# Patient Record
Sex: MALE | Race: WHITE | NOT HISPANIC OR LATINO | Employment: FULL TIME | URBAN - METROPOLITAN AREA
[De-identification: names, ages, dates, MRNs, and addresses within clinical notes are randomized per-mention and may not be internally consistent; named-entity substitution may affect disease eponyms.]

---

## 2018-08-21 ENCOUNTER — APPOINTMENT (OUTPATIENT)
Dept: MRI IMAGING | Facility: HOSPITAL | Age: 33
End: 2018-08-21
Attending: EMERGENCY MEDICINE

## 2018-08-21 ENCOUNTER — HOSPITAL ENCOUNTER (EMERGENCY)
Facility: HOSPITAL | Age: 33
Discharge: HOME | End: 2018-08-21
Attending: EMERGENCY MEDICINE | Admitting: EMERGENCY MEDICINE

## 2018-08-21 VITALS
HEART RATE: 62 BPM | BODY MASS INDEX: 30.16 KG/M2 | RESPIRATION RATE: 13 BRPM | TEMPERATURE: 97.7 F | DIASTOLIC BLOOD PRESSURE: 78 MMHG | HEIGHT: 74 IN | WEIGHT: 235 LBS | SYSTOLIC BLOOD PRESSURE: 143 MMHG | OXYGEN SATURATION: 99 %

## 2018-08-21 DIAGNOSIS — R29.898 UPPER EXTREMITY WEAKNESS: Primary | ICD-10-CM

## 2018-08-21 DIAGNOSIS — G56.10 MEDIAN NERVE NEUROPATHY, UNSPECIFIED LATERALITY: Primary | ICD-10-CM

## 2018-08-21 LAB
ALBUMIN SERPL-MCNC: 4 G/DL (ref 3.5–5.2)
ALBUMIN/GLOB SERPL: 1.4 G/DL
ALP SERPL-CCNC: 54 U/L (ref 39–117)
ALT SERPL W P-5'-P-CCNC: 25 U/L (ref 1–41)
ANION GAP SERPL CALCULATED.3IONS-SCNC: 12.5 MMOL/L
AST SERPL-CCNC: 31 U/L (ref 1–40)
BASOPHILS # BLD AUTO: 0.01 10*3/MM3 (ref 0–0.2)
BASOPHILS NFR BLD AUTO: 0.1 % (ref 0–1.5)
BILIRUB SERPL-MCNC: 0.3 MG/DL (ref 0.1–1.2)
BILIRUB UR QL STRIP: NEGATIVE
BUN BLD-MCNC: 12 MG/DL (ref 6–20)
BUN/CREAT SERPL: 12 (ref 7–25)
CALCIUM SPEC-SCNC: 8.9 MG/DL (ref 8.6–10.5)
CHLORIDE SERPL-SCNC: 104 MMOL/L (ref 98–107)
CLARITY UR: CLEAR
CO2 SERPL-SCNC: 26.5 MMOL/L (ref 22–29)
COLOR UR: YELLOW
CREAT BLD-MCNC: 1 MG/DL (ref 0.76–1.27)
DEPRECATED RDW RBC AUTO: 39.6 FL (ref 37–54)
EOSINOPHIL # BLD AUTO: 0.01 10*3/MM3 (ref 0–0.7)
EOSINOPHIL NFR BLD AUTO: 0.1 % (ref 0.3–6.2)
ERYTHROCYTE [DISTWIDTH] IN BLOOD BY AUTOMATED COUNT: 11.7 % (ref 11.5–14.5)
GFR SERPL CREATININE-BSD FRML MDRD: 87 ML/MIN/1.73
GLOBULIN UR ELPH-MCNC: 2.9 GM/DL
GLUCOSE BLD-MCNC: 88 MG/DL (ref 65–99)
GLUCOSE UR STRIP-MCNC: NEGATIVE MG/DL
HCT VFR BLD AUTO: 44.1 % (ref 40.4–52.2)
HGB BLD-MCNC: 14.9 G/DL (ref 13.7–17.6)
HGB UR QL STRIP.AUTO: NEGATIVE
HOLD SPECIMEN: NORMAL
HOLD SPECIMEN: NORMAL
IMM GRANULOCYTES # BLD: 0.02 10*3/MM3 (ref 0–0.03)
IMM GRANULOCYTES NFR BLD: 0.3 % (ref 0–0.5)
KETONES UR QL STRIP: NEGATIVE
LEUKOCYTE ESTERASE UR QL STRIP.AUTO: NEGATIVE
LYMPHOCYTES # BLD AUTO: 1.64 10*3/MM3 (ref 0.9–4.8)
LYMPHOCYTES NFR BLD AUTO: 22.3 % (ref 19.6–45.3)
MCH RBC QN AUTO: 30.8 PG (ref 27–32.7)
MCHC RBC AUTO-ENTMCNC: 33.8 G/DL (ref 32.6–36.4)
MCV RBC AUTO: 91.3 FL (ref 79.8–96.2)
MONOCYTES # BLD AUTO: 0.75 10*3/MM3 (ref 0.2–1.2)
MONOCYTES NFR BLD AUTO: 10.2 % (ref 5–12)
NEUTROPHILS # BLD AUTO: 4.94 10*3/MM3 (ref 1.9–8.1)
NEUTROPHILS NFR BLD AUTO: 67.3 % (ref 42.7–76)
NITRITE UR QL STRIP: NEGATIVE
PH UR STRIP.AUTO: 5.5 [PH] (ref 5–8)
PLATELET # BLD AUTO: 168 10*3/MM3 (ref 140–500)
PMV BLD AUTO: 10.7 FL (ref 6–12)
POTASSIUM BLD-SCNC: 4 MMOL/L (ref 3.5–5.2)
PROT SERPL-MCNC: 6.9 G/DL (ref 6–8.5)
PROT UR QL STRIP: NEGATIVE
RBC # BLD AUTO: 4.83 10*6/MM3 (ref 4.6–6)
SODIUM BLD-SCNC: 143 MMOL/L (ref 136–145)
SP GR UR STRIP: 1.02 (ref 1–1.03)
TROPONIN T SERPL-MCNC: <0.01 NG/ML (ref 0–0.03)
UROBILINOGEN UR QL STRIP: NORMAL
WBC NRBC COR # BLD: 7.35 10*3/MM3 (ref 4.5–10.7)
WHOLE BLOOD HOLD SPECIMEN: NORMAL
WHOLE BLOOD HOLD SPECIMEN: NORMAL

## 2018-08-21 PROCEDURE — 93005 ELECTROCARDIOGRAM TRACING: CPT | Performed by: EMERGENCY MEDICINE

## 2018-08-21 PROCEDURE — 81003 URINALYSIS AUTO W/O SCOPE: CPT | Performed by: NURSE PRACTITIONER

## 2018-08-21 PROCEDURE — 70551 MRI BRAIN STEM W/O DYE: CPT

## 2018-08-21 PROCEDURE — 72141 MRI NECK SPINE W/O DYE: CPT

## 2018-08-21 PROCEDURE — 99284 EMERGENCY DEPT VISIT MOD MDM: CPT | Performed by: PSYCHIATRY & NEUROLOGY

## 2018-08-21 PROCEDURE — 80053 COMPREHEN METABOLIC PANEL: CPT | Performed by: NURSE PRACTITIONER

## 2018-08-21 PROCEDURE — 93010 ELECTROCARDIOGRAM REPORT: CPT | Performed by: INTERNAL MEDICINE

## 2018-08-21 PROCEDURE — 85025 COMPLETE CBC W/AUTO DIFF WBC: CPT | Performed by: NURSE PRACTITIONER

## 2018-08-21 PROCEDURE — 84484 ASSAY OF TROPONIN QUANT: CPT | Performed by: EMERGENCY MEDICINE

## 2018-08-21 PROCEDURE — 99284 EMERGENCY DEPT VISIT MOD MDM: CPT

## 2018-08-21 NOTE — ED PROVIDER NOTES
EMERGENCY DEPARTMENT ENCOUNTER    CHIEF COMPLAINT  Chief Complaint: bilateral hand weakness  History given by: Pt  History limited by: none  Room Number: 24/24  PMD: Provider, No Known      HPI:  Pt is a 33 y.o. male who presents complaining of bilateral hand weakness for 36 hrs. Pt states weakness in hands has improved some. Pt c/o diaphoresis and fatigue at work this morning. Pt c/o mild weakness in legs and neck pain. Pt denies fever, chills, CP, SOA, nausea, vomiting, dizziness, difficult ambulating. Pt recently went on a trip to [place] but denies insect bites.     Duration:  For 36 hrs  Onset: gradual  Timing: constant  Location: bilateral hands  Radiation: none  Quality: weakness  Intensity/Severity: moderate  Progression: worsening  Associated Symptoms: diaphoresis, fatigue, mild weakness in legs, mild neck pain  Previous Episodes: none  Treatment before arrival: none    PAST MEDICAL HISTORY  Active Ambulatory Problems     Diagnosis Date Noted   • No Active Ambulatory Problems     Resolved Ambulatory Problems     Diagnosis Date Noted   • No Resolved Ambulatory Problems     No Additional Past Medical History       PAST SURGICAL HISTORY  History reviewed. No pertinent surgical history.    FAMILY HISTORY  History reviewed. No pertinent family history.    SOCIAL HISTORY  Social History     Social History   • Marital status:      Spouse name: N/A   • Number of children: N/A   • Years of education: N/A     Occupational History   • Not on file.     Social History Main Topics   • Smoking status: Never Smoker   • Smokeless tobacco: Never Used   • Alcohol use Yes   • Drug use: Unknown   • Sexual activity: Not on file     Other Topics Concern   • Not on file     Social History Narrative   • No narrative on file       ALLERGIES  Patient has no known allergies.    REVIEW OF SYSTEMS  Review of Systems   Constitutional: Positive for diaphoresis and fatigue. Negative for activity change, appetite change, chills  and fever.   HENT: Negative for congestion and sore throat.    Eyes: Negative.    Respiratory: Negative for cough and shortness of breath.    Cardiovascular: Negative for chest pain and leg swelling.   Gastrointestinal: Negative for abdominal pain, diarrhea, nausea and vomiting.   Endocrine: Negative.    Genitourinary: Negative for decreased urine volume and dysuria.   Musculoskeletal: Positive for neck pain (mild).   Skin: Negative for rash and wound.   Allergic/Immunologic: Negative.    Neurological: Positive for dizziness and weakness (bilateral hand, mild in legs). Negative for numbness and headaches.        Denies difficulty ambulating   Hematological: Negative.    Psychiatric/Behavioral: Negative.    All other systems reviewed and are negative.      PHYSICAL EXAM  ED Triage Vitals   Temp Heart Rate Resp BP SpO2   08/21/18 1004 08/21/18 1004 08/21/18 1004 08/21/18 1018 08/21/18 1004   98.2 °F (36.8 °C) 80 16 133/84 99 %      Temp src Heart Rate Source Patient Position BP Location FiO2 (%)   08/21/18 1004 -- -- -- --   Tympanic           Physical Exam   Constitutional: He is oriented to person, place, and time. No distress.   HENT:   Head: Normocephalic and atraumatic.   Mouth/Throat: Oropharynx is clear and moist.   Eyes:   Unremarkable   Neck: Normal range of motion. Neck supple. Muscular tenderness (mild) present.   Cardiovascular: Normal rate and regular rhythm.    Pulmonary/Chest: Breath sounds normal. No respiratory distress.   Abdominal: There is no tenderness.   Musculoskeletal: He exhibits no edema or tenderness.   Neurological: He is alert and oriented to person, place, and time. He displays weakness (BUE). No cranial nerve deficit. Gait normal.   Dereased  strength  BLE motor strength intact  Pt can do deep knee bend   Skin: No rash noted.   Nursing note and vitals reviewed.      LAB RESULTS  Lab Results (last 24 hours)     Procedure Component Value Units Date/Time    CBC & Differential  [[id number]] Collected:  08/21/18 1021    Specimen:  Blood Updated:  08/21/18 1105    Narrative:       The following orders were created for panel order CBC & Differential.  Procedure                               Abnormality         Status                     ---------                               -----------         ------                     CBC Auto Differential[[id number]]        Abnormal            Final result                 Please view results for these tests on the individual orders.    Comprehensive Metabolic Panel [[id number]] Collected:  08/21/18 1021    Specimen:  Blood Updated:  08/21/18 1115     Glucose 88 mg/dL      BUN 12 mg/dL      Creatinine 1.00 mg/dL      Sodium 143 mmol/L      Potassium 4.0 mmol/L      Chloride 104 mmol/L      CO2 26.5 mmol/L      Calcium 8.9 mg/dL      Total Protein 6.9 g/dL      Albumin 4.00 g/dL      ALT (SGPT) 25 U/L      AST (SGOT) 31 U/L      Alkaline Phosphatase 54 U/L      Total Bilirubin 0.3 mg/dL      eGFR Non African Amer 87 mL/min/1.73      Globulin 2.9 gm/dL      A/G Ratio 1.4 g/dL      BUN/Creatinine Ratio 12.0     Anion Gap 12.5 mmol/L     CBC Auto Differential [[id number]]  (Abnormal) Collected:  08/21/18 1021    Specimen:  Blood Updated:  08/21/18 1105     WBC 7.35 10*3/mm3      RBC 4.83 10*6/mm3      Hemoglobin 14.9 g/dL      Hematocrit 44.1 %      MCV 91.3 fL      MCH 30.8 pg      MCHC 33.8 g/dL      RDW 11.7 %      RDW-SD 39.6 fl      MPV 10.7 fL      Platelets 168 10*3/mm3      Neutrophil % 67.3 %      Lymphocyte % 22.3 %      Monocyte % 10.2 %      Eosinophil % 0.1 (L) %      Basophil % 0.1 %      Immature Grans % 0.3 %      Neutrophils, Absolute 4.94 10*3/mm3      Lymphocytes, Absolute 1.64 10*3/mm3      Monocytes, Absolute 0.75 10*3/mm3      Eosinophils, Absolute 0.01 10*3/mm3      Basophils, Absolute 0.01 10*3/mm3      Immature Grans, Absolute 0.02 10*3/mm3     Troponin [[id number]]  (Normal) Collected:  08/21/18 1021    Specimen:  Blood Updated:  08/21/18  1230     Troponin T <0.010 ng/mL     Narrative:       Troponin T Reference Ranges:  Less than 0.03 ng/mL:    Negative for AMI  0.03 to 0.09 ng/mL:      Indeterminant for AMI  Greater than 0.09 ng/mL: Positive for AMI    Urinalysis With Microscopic If Indicated (No Culture) - Urine, Clean Catch [[id number]]  (Normal) Collected:  08/21/18 1122    Specimen:  Urine from Urine, Clean Catch Updated:  08/21/18 1131     Color, UA Yellow     Appearance, UA Clear     pH, UA 5.5     Specific Gravity, UA 1.021     Glucose, UA Negative     Ketones, UA Negative     Bilirubin, UA Negative     Blood, UA Negative     Protein, UA Negative     Leuk Esterase, UA Negative     Nitrite, UA Negative     Urobilinogen, UA 0.2 E.U./dL    Narrative:       Urine microscopic not indicated.          I ordered the above labs and reviewed the results    RADIOLOGY  MRI Cervical Spine Without Contrast   Preliminary Result   Essentially normal MRI of the cervical spine.                  MRI Brain Without Contrast   Preliminary Result   No acute process identified.                       I ordered the above noted radiological studies. Interpreted by radiologist. Reviewed by me in PACS.       PROCEDURES  Procedures  EKG           EKG time: 12:30 PM  Rhythm/Rate: sinus 52  P waves and AL: normal  QRS, axis: normal   ST and T waves: normal     Interpreted Contemporaneously by me, independently viewed  No prior      PROGRESS AND CONSULTS  ED Course as of Aug 21 1747   Tue Aug 21, 2018   1058 Pt feels more tired than normal, just recently returned from vacation.  Feels like his hand  is weaker than normal.  Pt denies chest pain, shortness of air, headache, fever, chills.  [MS]      ED Course User Index  [MS] Dena Swanson, APRN     12:10 PM  Rechecked pt who is resting in NAD. Informed pt of normal labs results.  Pt denies incontinence. Discussed plan to get head MRI.     12:13 PM  MRI brain and cervical spine ordered for further evaluation.  Troponin and EKG ordered.    4:00 PM  Rechecked pt who is resting in NAD. Informed pt of normal MRIs and plan to consult with neurology.   Consult placed to neurology.     4:37 PM  Discussed pt case with Dr. Davidson, neurology. He will see the pt in the ED.     5:36 PM  Spoke with Dr. Davidson after he evaluated the pt. He thinks the pt can be discharged and will order and EMG and f/u with him in 1 week.    5:44 PM  Rechecked pt who is resting in NAD. Informed pt of plan to discharge ad have  F/u with Dr. Davidson. RTER instructions given. Pt understands and agrees with the plan, all questions answered.      MEDICAL DECISION MAKING  Results were reviewed/discussed with the patient and they were also made aware of online access. Pt also made aware that some labs, such as cultures, will not be resulted during ER visit and follow up with PMD is necessary.     MDM  Number of Diagnoses or Management Options     Amount and/or Complexity of Data Reviewed  Clinical lab tests: reviewed and ordered (Troponin is <0.010  UA negative)  Tests in the radiology section of CPT®: reviewed and ordered (MRI brain-nothing acute  MRI cervical spine-normal)  Tests in the medicine section of CPT®: reviewed and ordered (See EKG note)  Decide to obtain previous medical records or to obtain history from someone other than the patient: yes  Review and summarize past medical records: yes  Discuss the patient with other providers: yes (Dr. Davidson, neurology)           DIAGNOSIS  Final diagnoses:   Upper extremity weakness       DISPOSITION  DISCHARGE    Patient discharged in stable condition.    Reviewed implications of results, diagnosis, meds, responsibility to follow up, warning signs and symptoms of possible worsening, potential complications and reasons to return to ER.    Patient/Family voiced understanding of above instructions.    Discussed plan for discharge, as there is no emergent indication for admission. Patient referred to  primary care provider for BP management due to today's BP. Pt/family is agreeable and understands need for follow up and repeat testing.  Pt is aware that discharge does not mean that nothing is wrong but it indicates no emergency is present that requires admission and they must continue care with follow-up as given below or physician of their choice.     FOLLOW-UP  [facility] [place], [place] NEUROLOGY [facility]  [address]  [place] [address] [phone]    follow up with Dr. Lizama for continued evaluation of symptoms         Medication List      No changes were made to your prescriptions during this visit.           Latest Documented Vital Signs:  As of 5:47 PM  BP- 143/78 HR- 59 Temp- 98.2 °F (36.8 °C) (Tympanic) O2 sat- 98%    --  Documentation assistance provided by rivera Díaz for Dr. Juarez.  Information recorded by the scribe was done at my direction and has been verified and validated by me.       Mona Díaz  08/21/18 1284       Steven Juarez MD  08/21/18 8712

## 2018-08-21 NOTE — CONSULTS
Neurology Consult Note    Consult Date: 8/21/2018    Referring MD: No ref. provider found    Reason for Consult I have been asked to see the patient in neurological consultation to render advice and opinion regarding bilateral hand weakness    Modesto Glass is a 33 y.o. male with no PMH who presents with 2 days of bilateral hand weakness. Onset of symptoms was yesterday; upon awakening he noticed muscle soreness of both forearms then noticed he had difficulty with  in both hands and was unable to tie his shoes or open jars. Today he went to work (he works as a  in an ED) and was unable to perform simple procedures in his job and felt fatigued after only 20 minutes of work. His coworkers recommended he come in to the ED to be evaluated. Since onset of his symptoms yesterday he feels there has been mild improvement of his hand strength.     Past Medical/Surgical Hx:  History reviewed. No pertinent past medical history.  History reviewed. No pertinent surgical history.    Medications On Admission  No medications      Allergies:  No Known Allergies    Social Hx:  Social History     Social History   • Marital status:      Spouse name: N/A   • Number of children: N/A   • Years of education: N/A     Occupational History   • Not on file.     Social History Main Topics   • Smoking status: Never Smoker   • Smokeless tobacco: Never Used   • Alcohol use Yes   • Drug use: Unknown   • Sexual activity: Not on file     Other Topics Concern   • Not on file     Social History Narrative   • No narrative on file       Family Hx:  History reviewed. No pertinent family history.    Review of Systems  Constitutional: [No fevers, chills]  Eye: [No recent visual problems, eye discharge]  HEENT: [No ear pain, nasal congestion]  Respiratory: [No shortness of breath, cough]  Cardiovascular: [No Chest pain, palpitations]  Gastrointestinal: [No nausea, vomiting]  Genitourinary: [No hematuria, incontinence]  Hema/Lymph:  "[no nosebleeds, history of anticoagulation]  Endocrine: [Negative for excessive urination, heat or cold intolerance]  Musculoskeletal: [No back pain, neck pain]  Integumentary: [No rash, pruritus]  Neurologic: [+ weakness, no numbness]  Psychiatric: [No anxiety, depression]    Exam    /78   Pulse 59   Temp 98.2 °F (36.8 °C) (Tympanic)   Resp 16   Ht 188 cm (74\")   Wt 107 kg (235 lb)   SpO2 98%   BMI 30.17 kg/m²   gen: NAD, vitals reviewed  Eyes: fundus sharp with no papilledema or retinal hemorrhages  HEENT: no nuchal rigidity  CVS: RRR, S1, S2  MS: oriented x3, recent/remote memory intact, normal attention/concentration, language intact, no neglect, normal fund of knowledge  CN: visual acuity grossly normal, visual fields full, PERRL, EOMI, facial sensation equal, no facial droop, hearing symmetric, no dysarthria, shoulder shrug equal, tongue midline  Motor: 4+/5 bilateral FDP and FPL, otherwise 5/5 throughout upper and lower extremities, normal tone  Sensation: intact to vibration and temperature throughout  Reflexes: 2+ throughout upper and lower extremities, downgoing plantars  Coordination: no dysmetria with finger to nose bilaterally  Gait: no ataxia    DATA:    Lab Results   Component Value Date    GLUCOSE 88 08/21/2018    CALCIUM 8.9 08/21/2018     08/21/2018    K 4.0 08/21/2018    CO2 26.5 08/21/2018     08/21/2018    BUN 12 08/21/2018    CREATININE 1.00 08/21/2018    EGFRIFNONA 87 08/21/2018    BCR 12.0 08/21/2018    ANIONGAP 12.5 08/21/2018     Lab Results   Component Value Date    WBC 7.35 08/21/2018    HGB 14.9 08/21/2018    HCT 44.1 08/21/2018    MCV 91.3 08/21/2018     08/21/2018     No results found for: CHOL  No results found for: HDL  No results found for: LDL  No results found for: TRIG  No results found for: HGBA1C  No results found for: INR, PROTIME    Imaging review: MRI brain and spine personally reviewed which show no stroke, no MS lesions, spinal cord signal " looks normal, no disc herniations or root impingement seen.    Discussed with Dr. Juarez    CBC BMP normal    Impression:  1) Bilateral median motor neuropathy  2) Fatigue    Comment: 34 yo WM otherwise healthy presents with acute bilateral hand weakness in a median nerve distribution without any associated sensory loss. Symptoms do not seem to be progressing since onset and actually he feels his hand strength has somewhat improved. Cause of his symptoms is not clear at this point. MRI brain and spine are normal. Next step will be to get NCS/EMG which I will arrange in our office for later this week. I will see him in follow up in one week to see how his symptoms are progressing. He knows to call us or return to the ED is symptoms worsen or he develops any ascending numbness or weakness.    PLAN:  1. Will arrange prompt NCS/EMG in our clinic  2. Follow up with me in 1 week to evaluate for progression of symptoms.

## 2018-08-21 NOTE — DISCHARGE INSTRUCTIONS
Return to the ED as discussed for worsening weakness, numbness, difficulty walking, difficulty swallowing, dizziness or any other complaints.

## 2018-08-23 ENCOUNTER — HOSPITAL ENCOUNTER (OUTPATIENT)
Dept: INFUSION THERAPY | Facility: HOSPITAL | Age: 33
Discharge: HOME | End: 2018-08-23
Attending: PSYCHIATRY & NEUROLOGY

## 2018-09-18 ENCOUNTER — TELEPHONE (OUTPATIENT)
Dept: NEUROLOGY | Facility: CLINIC | Age: 33
End: 2018-09-18

## 2018-09-18 NOTE — TELEPHONE ENCOUNTER
I have left two voicemails and just called and was hung up on in attempt to schedule a follow up appt with Dr. Davidson.    Left pt vm 8/27/18 1507 - km  LEFT VM 9/4/18 1035 - KM

## 2018-09-18 NOTE — TELEPHONE ENCOUNTER
----- Message from Saul Davidson MD sent at 8/27/2018 10:43 AM EDT -----  Regarding: RE:  Contact: [phone]  That’s fine, just over book me and add Mr. Glass on to the end.  Sumeet  ----- Message -----  From: Naomi Ortiz MA  Sent: 8/27/2018  10:41 AM  To: Saul Davidson MD  Subject: RE:                                              Sorry to bother you again... Mr. Neely took your last spot on 9/12/18. You are completely full that day.    ----- Message -----  From: Saul Davidson MD  Sent: 8/27/2018   9:32 AM  To: Naomi Ortiz MA  Subject: RE:                                              9/12 is fine.  Thanks  ----- Message -----  From: Naomi Ortiz MA  Sent: 8/27/2018   9:26 AM  To: Saul Davidson MD  Subject: RE:                                              Ok, you have one opening left on 9/12/18 you next clinic day. Did you want to wait to see him until them, or would you want me to schedule him a day you are at the hospital and come over to see him?    Naomi Mercado  ----- Message -----  From: Saul Davidson MD  Sent: 8/23/2018   8:41 PM  To: Naomi Otriz MA  Subject: RE:                                              Ok that’s fine.  Thanks  ----- Message -----  From: Naomi Ortiz MA  Sent: 8/23/2018   3:32 PM  To: MD Dr. Stuart Wilkinson,    Please read Kathleen's message below in regards to the patient.    Thanks!    ----- Message -----  From: Kathleen Sánchez  Sent: 8/23/2018   1:54 PM  To: LEATHA Arce put in a order for patient to have EMG done. It was scheduled for today, but patient canceled. Here is the message from patient...PT WANTED TO CANCEL EMG AND SCHEDULE AN APPT WITH  FOR CONSULT.